# Patient Record
Sex: FEMALE | Race: BLACK OR AFRICAN AMERICAN | NOT HISPANIC OR LATINO | Employment: STUDENT | URBAN - METROPOLITAN AREA
[De-identification: names, ages, dates, MRNs, and addresses within clinical notes are randomized per-mention and may not be internally consistent; named-entity substitution may affect disease eponyms.]

---

## 2023-10-23 ENCOUNTER — HOSPITAL ENCOUNTER (EMERGENCY)
Facility: OTHER | Age: 23
Discharge: HOME | End: 2023-10-24
Attending: EMERGENCY MEDICINE
Payer: MEDICAID

## 2023-10-23 DIAGNOSIS — H66.92 LEFT OTITIS MEDIA, UNSPECIFIED OTITIS MEDIA TYPE: Primary | ICD-10-CM

## 2023-10-23 PROCEDURE — 81025 URINE PREGNANCY TEST: CPT

## 2023-10-23 PROCEDURE — 99284 EMERGENCY DEPT VISIT MOD MDM: CPT

## 2023-10-24 VITALS
DIASTOLIC BLOOD PRESSURE: 75 MMHG | BODY MASS INDEX: 18.78 KG/M2 | HEART RATE: 75 BPM | SYSTOLIC BLOOD PRESSURE: 122 MMHG | RESPIRATION RATE: 18 BRPM | WEIGHT: 110 LBS | OXYGEN SATURATION: 100 % | TEMPERATURE: 99 F | HEIGHT: 64 IN

## 2023-10-24 LAB
B-HCG UR QL: NEGATIVE
CTP QC/QA: YES

## 2023-10-24 PROCEDURE — 25000003 PHARM REV CODE 250

## 2023-10-24 RX ORDER — NEOMYCIN SULFATE, POLYMYXIN B SULFATE AND HYDROCORTISONE 10; 3.5; 1 MG/ML; MG/ML; [USP'U]/ML
4 SUSPENSION/ DROPS AURICULAR (OTIC) 3 TIMES DAILY
Qty: 10 ML | Refills: 0 | Status: SHIPPED | OUTPATIENT
Start: 2023-10-23 | End: 2023-10-24 | Stop reason: SDUPTHER

## 2023-10-24 RX ORDER — AMOXICILLIN AND CLAVULANATE POTASSIUM 875; 125 MG/1; MG/1
1 TABLET, FILM COATED ORAL 2 TIMES DAILY
Qty: 14 TABLET | Refills: 0 | Status: SHIPPED | OUTPATIENT
Start: 2023-10-23 | End: 2023-10-24 | Stop reason: SDUPTHER

## 2023-10-24 RX ORDER — AMOXICILLIN AND CLAVULANATE POTASSIUM 875; 125 MG/1; MG/1
1 TABLET, FILM COATED ORAL 2 TIMES DAILY
Qty: 14 TABLET | Refills: 0 | Status: SHIPPED | OUTPATIENT
Start: 2023-10-24 | End: 2023-10-31

## 2023-10-24 RX ORDER — NEOMYCIN SULFATE, POLYMYXIN B SULFATE AND HYDROCORTISONE 10; 3.5; 1 MG/ML; MG/ML; [USP'U]/ML
4 SUSPENSION/ DROPS AURICULAR (OTIC) 3 TIMES DAILY
Qty: 10 ML | Refills: 0 | Status: SHIPPED | OUTPATIENT
Start: 2023-10-24 | End: 2023-10-31

## 2023-10-24 RX ORDER — AMOXICILLIN AND CLAVULANATE POTASSIUM 875; 125 MG/1; MG/1
1 TABLET, FILM COATED ORAL
Status: COMPLETED | OUTPATIENT
Start: 2023-10-24 | End: 2023-10-24

## 2023-10-24 RX ADMIN — AMOXICILLIN AND CLAVULANATE POTASSIUM 1 TABLET: 875; 125 TABLET, FILM COATED ORAL at 12:10

## 2023-10-24 NOTE — FIRST PROVIDER EVALUATION
Emergency Department TeleTriage Encounter Note      CHIEF COMPLAINT    Chief Complaint   Patient presents with    Otalgia     Reports ;eft ear pain since last night, denies drainage, took BC powder and naprosyn with no improvement. Recently washed hair and feels like too much water went into canal        VITAL SIGNS   Initial Vitals [10/23/23 2153]   BP Pulse Resp Temp SpO2   125/86 81 17 98.6 °F (37 °C) 100 %      MAP       --            ALLERGIES    Review of patient's allergies indicates:  No Known Allergies    PROVIDER TRIAGE NOTE    23-year-old female presents with left-sided otalgia since yesterday.  Took naproxen without relief.  Vital signs are normal.  Reports pain travels throughout the left side of the face.    ORDERS  Labs Reviewed - No data to display    ED Orders (720h ago, onward)      None              Virtual Visit Note: The provider triage portion of this emergency department evaluation and documentation was performed via [employer], a HIPAA-compliant telemedicine application, in concert with a tele-presenter in the room. A face to face patient evaluation with one of my colleagues will occur once the patient is placed in an emergency department room.      DISCLAIMER: This note was prepared with Chain voice recognition transcription software. Garbled syntax, mangled pronouns, and other bizarre constructions may be attributed to that software system.

## 2023-10-24 NOTE — ED PROVIDER NOTES
CHIEF COMPLAINT:   Chief Complaint   Patient presents with    Otalgia     Reports left ear pain since last night, denies drainage, took BC powder and naprosyn with no improvement. Recently washed hair and feels like too much water went into canal        HISTORY OF PRESENT ILLNESS: Nahum Bocanegra who is a 23 y.o. presents to the emergency department today with complaint of left ear pain x1 day.  Patient reports that she woke up last night with pain to the left ear.  States the pain is worse with opening of her jaw and was swallowing.  She reports the pain is radiating down her left jaw.  She denies any drainage from the left ear.  Denies any recent swimming, water exposure to the left ear.  Denies any fever, chills, cough, congestion, sore throat, body aches, systemic symptoms.  Reports taking aspirin and goody's BC with minimal relief.    REVIEW OF SYSTEMS:  Constitutional:  No fever, no chills.  Eyes: No discharge. No pain.  HENT: no nasal congestion, no sore throat. + left ear pain.  Cardiovascular: No chest pain, no palpitations.  Respiratory:  No cough, no shortness of breath.  Gastrointestinal: no nausea, no diarrhea, No abdominal pain, no vomiting.   Genitourinary: No hematuria, dysuria, urgency.  Musculoskeletal:  No back pain, no body aches.  Skin: No rashes, no lesions.  Neurological:  No headache, no focal weakness.    Otherwise remaining ROS negative     ALLERGIES REVIEWED  MEDICATIONS REVIEWED  PMH/PSH/SOC/FH REVIEWED     The history is provided by the patient.    Nursing/Ancillary staff note reviewed.        PHYSICAL EXAM:  VS reviewed  Vitals:    10/24/23 0016   BP: 122/75   Pulse: 75   Resp: 18   Temp: 98.7 °F (37.1 °C)       General Appearance: The patient is alert, has no immediate or signs of toxicity. No acute distress.    HEENT: Eyes:  With no injection, No drainage. Ears: Right TM intact, nonerythematous. Left ear with TM with erythema, bulging present. There is some erythema at the distal  canal as well. Patient reports tenderness to mastoid and preauricular region, tenderness with manipulation of auricle on exam. No overlying erythema, swelling of preauricular or mastoid area. Throat:  No tonsillar swelling or exudates, uvula midline.  No significant erythema.  Mucous membranes moist  Neck:Neck is with No stridor.  No submandibular, cervical lymphadenopathy.  No meningismus.  Respiratory: There are no retractions.  Lungs clear to auscultation bilaterally  Cardiovascular: Regular rate and rhythm.  Gastrointestinal:  Abdomen is without distention.  Soft, no tenderness to palpation.  Neurological: Alert and oriented x 4. No focal weakness.  Skin: Warm and dry, no rashes.  Musculoskeletal: Extremities are non-swollen and have full range of motion.      Past Medical History:   Diagnosis Date    ADHD     Asthma     Encounter for blood transfusion          No past surgical history on file.      ED COURSE:  ED Course as of 10/24/23 0246   Tue Oct 24, 2023   0015 Preg Test, Ur: Negative [SW]      ED Course User Index  [SW] Jaycee Aquino PA-C     Results for orders placed or performed during the hospital encounter of 10/23/23   POCT urine pregnancy   Result Value Ref Range    POC Preg Test, Ur Negative Negative     Acceptable Yes      Imaging Results    None         Patient presenting with general illness symptoms; appears well and nontoxic. Exam grossly unremarkable at this time.    DIFFERENTIAL DIAGNOSIS: After history and physical exam a differential diagnosis was considered, but was not limited to,   Sepsis, meningitis, otitis media/external, nasal polyp, bacterial sinusitis, allergic rhinitis, influenza, COVID19, bacterial/viral pharyngitis, bacterial/viral pneumonia.    ED management: Patient seen for left ear pain x1 day.  Vital signs stable, afebrile.  Exam findings concerning for acute otitis media, left TM visualized an erythematous, bulging.  We will discharge with Augmentin.   However, patient with significant pain in the preauricular region and reports some mild tenderness in the mastoid region.  She also reported some pain with manipulation of auricle during exam.  There was some erythema present to the distal ear canal on exam.  Given mixed symptoms, we will cover for external otitis media as well.  Do not suspect mastoiditis, extending infection at this time, given symptoms have been present for 1 day and patient is overall well-appearing, afebrile, immunocompetent,  no overlying swelling or erythema noted on exam.  We will discharge with oral Augmentin and topical Cortisporin.  We will place referral to ENT for further evaluation.  Patient advised to follow up with PCP as well.  Tylenol and ibuprofen as needed for pain.  Vital signs did not indicate sepsis and patient was welling appearing, okay for discharge home. I discussed this case with my attending, Dr. Aguirre, who was in agreement with plan.      IMPRESSION  The encounter diagnosis was Left otitis media, unspecified otitis media type. Strict instructions to follow up with primary care physician or reference provided for further assessment and evaluation. Given instructions to return for any acute symptoms and verbalized understanding of this medical plan.      Please pardon typos or dictation errors, as this note was transcribed using M*Modal fluency direct dictation software.              Jaycee Aquino PA-C  10/24/23 0245

## 2023-10-24 NOTE — ED TRIAGE NOTES
Pt complains of left ear pain that began last night while she was asleep. States that pain gets worse when she tries to talk. Denies SOB or N/V associated with pain.

## 2023-10-24 NOTE — DISCHARGE INSTRUCTIONS
